# Patient Record
Sex: MALE | Race: WHITE | NOT HISPANIC OR LATINO | Employment: OTHER | URBAN - METROPOLITAN AREA
[De-identification: names, ages, dates, MRNs, and addresses within clinical notes are randomized per-mention and may not be internally consistent; named-entity substitution may affect disease eponyms.]

---

## 2024-01-14 ENCOUNTER — APPOINTMENT (OUTPATIENT)
Dept: RADIOLOGY | Facility: HOSPITAL | Age: 79
End: 2024-01-14
Payer: COMMERCIAL

## 2024-01-14 ENCOUNTER — HOSPITAL ENCOUNTER (EMERGENCY)
Facility: HOSPITAL | Age: 79
Discharge: HOME | End: 2024-01-15
Payer: COMMERCIAL

## 2024-01-14 DIAGNOSIS — S61.216A LACERATION OF RIGHT LITTLE FINGER, FOREIGN BODY PRESENCE UNSPECIFIED, NAIL DAMAGE STATUS UNSPECIFIED, INITIAL ENCOUNTER: Primary | ICD-10-CM

## 2024-01-14 DIAGNOSIS — S61.214A LACERATION OF RIGHT RING FINGER WITHOUT DAMAGE TO NAIL, FOREIGN BODY PRESENCE UNSPECIFIED, INITIAL ENCOUNTER: ICD-10-CM

## 2024-01-14 PROCEDURE — 96372 THER/PROPH/DIAG INJ SC/IM: CPT

## 2024-01-14 PROCEDURE — 99284 EMERGENCY DEPT VISIT MOD MDM: CPT

## 2024-01-14 PROCEDURE — 90715 TDAP VACCINE 7 YRS/> IM: CPT

## 2024-01-14 PROCEDURE — 2500000004 HC RX 250 GENERAL PHARMACY W/ HCPCS (ALT 636 FOR OP/ED)

## 2024-01-14 PROCEDURE — 12001 RPR S/N/AX/GEN/TRNK 2.5CM/<: CPT

## 2024-01-14 PROCEDURE — 73130 X-RAY EXAM OF HAND: CPT | Mod: RIGHT SIDE | Performed by: RADIOLOGY

## 2024-01-14 PROCEDURE — 99283 EMERGENCY DEPT VISIT LOW MDM: CPT | Mod: 25

## 2024-01-14 PROCEDURE — 2500000005 HC RX 250 GENERAL PHARMACY W/O HCPCS

## 2024-01-14 PROCEDURE — 73130 X-RAY EXAM OF HAND: CPT | Mod: RT,FR

## 2024-01-14 PROCEDURE — 90471 IMMUNIZATION ADMIN: CPT

## 2024-01-14 RX ORDER — LIDOCAINE HYDROCHLORIDE 10 MG/ML
20 INJECTION INFILTRATION; PERINEURAL ONCE
Status: COMPLETED | OUTPATIENT
Start: 2024-01-14 | End: 2024-01-14

## 2024-01-14 RX ORDER — ACETAMINOPHEN 325 MG/1
650 TABLET ORAL ONCE
Status: COMPLETED | OUTPATIENT
Start: 2024-01-14 | End: 2024-01-14

## 2024-01-14 RX ADMIN — TETANUS TOXOID, REDUCED DIPHTHERIA TOXOID AND ACELLULAR PERTUSSIS VACCINE, ADSORBED 0.5 ML: 5; 2.5; 8; 8; 2.5 SUSPENSION INTRAMUSCULAR at 22:14

## 2024-01-14 RX ADMIN — ACETAMINOPHEN 650 MG: 325 TABLET ORAL at 22:13

## 2024-01-14 RX ADMIN — LIDOCAINE HYDROCHLORIDE 200 MG: 10 INJECTION, SOLUTION INFILTRATION; PERINEURAL at 22:10

## 2024-01-14 ASSESSMENT — COLUMBIA-SUICIDE SEVERITY RATING SCALE - C-SSRS
6. HAVE YOU EVER DONE ANYTHING, STARTED TO DO ANYTHING, OR PREPARED TO DO ANYTHING TO END YOUR LIFE?: NO
2. HAVE YOU ACTUALLY HAD ANY THOUGHTS OF KILLING YOURSELF?: NO
1. IN THE PAST MONTH, HAVE YOU WISHED YOU WERE DEAD OR WISHED YOU COULD GO TO SLEEP AND NOT WAKE UP?: NO

## 2024-01-14 ASSESSMENT — LIFESTYLE VARIABLES
EVER FELT BAD OR GUILTY ABOUT YOUR DRINKING: NO
REASON UNABLE TO ASSESS: NO
HAVE PEOPLE ANNOYED YOU BY CRITICIZING YOUR DRINKING: NO
EVER HAD A DRINK FIRST THING IN THE MORNING TO STEADY YOUR NERVES TO GET RID OF A HANGOVER: NO
HAVE YOU EVER FELT YOU SHOULD CUT DOWN ON YOUR DRINKING: NO

## 2024-01-14 ASSESSMENT — PAIN DESCRIPTION - PROGRESSION: CLINICAL_PROGRESSION: NOT CHANGED

## 2024-01-14 ASSESSMENT — PAIN DESCRIPTION - DESCRIPTORS: DESCRIPTORS: ACHING

## 2024-01-14 ASSESSMENT — PAIN - FUNCTIONAL ASSESSMENT: PAIN_FUNCTIONAL_ASSESSMENT: 0-10

## 2024-01-14 ASSESSMENT — PAIN DESCRIPTION - ORIENTATION: ORIENTATION: RIGHT

## 2024-01-14 ASSESSMENT — PAIN SCALES - GENERAL: PAINLEVEL_OUTOF10: 8

## 2024-01-14 ASSESSMENT — PAIN DESCRIPTION - LOCATION: LOCATION: FINGER (COMMENT WHICH ONE)

## 2024-01-14 ASSESSMENT — PAIN DESCRIPTION - PAIN TYPE: TYPE: ACUTE PAIN

## 2024-01-15 VITALS
OXYGEN SATURATION: 96 % | TEMPERATURE: 98.1 F | BODY MASS INDEX: 25.73 KG/M2 | WEIGHT: 190 LBS | DIASTOLIC BLOOD PRESSURE: 84 MMHG | HEIGHT: 72 IN | SYSTOLIC BLOOD PRESSURE: 165 MMHG | RESPIRATION RATE: 16 BRPM | HEART RATE: 60 BPM

## 2024-01-15 PROCEDURE — 2500000001 HC RX 250 WO HCPCS SELF ADMINISTERED DRUGS (ALT 637 FOR MEDICARE OP)

## 2024-01-15 PROCEDURE — 2500000004 HC RX 250 GENERAL PHARMACY W/ HCPCS (ALT 636 FOR OP/ED)

## 2024-01-15 RX ORDER — SULFAMETHOXAZOLE AND TRIMETHOPRIM 800; 160 MG/1; MG/1
1 TABLET ORAL 2 TIMES DAILY
Qty: 14 TABLET | Refills: 0 | Status: SHIPPED | OUTPATIENT
Start: 2024-01-15 | End: 2024-01-22

## 2024-01-15 RX ORDER — CEPHALEXIN 500 MG/1
500 CAPSULE ORAL 4 TIMES DAILY
Qty: 40 CAPSULE | Refills: 0 | Status: SHIPPED | OUTPATIENT
Start: 2024-01-15 | End: 2024-01-25

## 2024-01-15 RX ORDER — CEPHALEXIN 500 MG/1
500 CAPSULE ORAL ONCE
Status: COMPLETED | OUTPATIENT
Start: 2024-01-15 | End: 2024-01-15

## 2024-01-15 RX ORDER — SULFAMETHOXAZOLE AND TRIMETHOPRIM 800; 160 MG/1; MG/1
1 TABLET ORAL ONCE
Status: COMPLETED | OUTPATIENT
Start: 2024-01-15 | End: 2024-01-15

## 2024-01-15 RX ADMIN — CEPHALEXIN 500 MG: 500 CAPSULE ORAL at 00:27

## 2024-01-15 RX ADMIN — SULFAMETHOXAZOLE AND TRIMETHOPRIM 1 TABLET: 800; 160 TABLET ORAL at 00:27

## 2024-01-15 ASSESSMENT — PAIN SCALES - GENERAL: PAINLEVEL_OUTOF10: 3

## 2024-01-15 ASSESSMENT — PAIN DESCRIPTION - LOCATION: LOCATION: FINGER (COMMENT WHICH ONE)

## 2024-01-15 ASSESSMENT — PAIN DESCRIPTION - DESCRIPTORS: DESCRIPTORS: ACHING

## 2024-01-15 ASSESSMENT — PAIN DESCRIPTION - PAIN TYPE: TYPE: ACUTE PAIN

## 2024-01-15 ASSESSMENT — PAIN DESCRIPTION - ORIENTATION: ORIENTATION: RIGHT

## 2024-01-15 ASSESSMENT — PAIN - FUNCTIONAL ASSESSMENT: PAIN_FUNCTIONAL_ASSESSMENT: 0-10

## 2024-01-15 NOTE — ED PROVIDER NOTES
HPI   Chief Complaint   Patient presents with    Finger Laceration     Patient stated right hand pinky finger got caught in between rope and trevino around 8:50pm. Right wrist pain. Denies blood thinners. Stated skin ripped off finger. Unsure if up to date on tetanus shot       Patient is a 78-year-old male with significant PMH of hypertension, hyperlipidemia presenting to the ED with cc of right finger injury x 945pm.  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  Patient is not up-to-date on his tetanus.  Patient denies any blood thinners.  Patient endorses some numbness at the tip of his right hand fifth digit.  Patient has not had any pain medication for symptoms.  Patient denies any other injuries.  Patient is right-handed.  Patient denies any fever chills chest pain shortness of breath abdominal pain nausea vomiting diarrhea constipation.                          Seb Coma Scale Score: 15                  Patient History   No past medical history on file.  No past surgical history on file.  No family history on file.  Social History     Tobacco Use    Smoking status: Not on file    Smokeless tobacco: Not on file   Substance Use Topics    Alcohol use: Not on file    Drug use: Not on file       Physical Exam   ED Triage Vitals [01/14/24 2156]   Temp Heart Rate Resp BP   36.8 °C (98.2 °F) 71 18 164/83      SpO2 Temp Source Heart Rate Source Patient Position   94 % Temporal Monitor Sitting      BP Location FiO2 (%)     Left arm --       Physical Exam  HENT:      Head: Normocephalic.   Eyes:      Extraocular Movements: Extraocular movements intact.      Pupils: Pupils are equal, round, and reactive to light.   Cardiovascular:      Rate and Rhythm: Normal rate and regular rhythm.      Pulses: Normal pulses.      Heart sounds: Normal heart sounds.   Pulmonary:      Effort: Pulmonary effort is normal.      Breath sounds: No wheezing, rhonchi or rales.   Abdominal:      Palpations: Abdomen is  soft.      Tenderness: There is no abdominal tenderness. There is no guarding or rebound.   Musculoskeletal:         General: Tenderness present.      Cervical back: Normal range of motion.      Comments: Pain on palpation to the right hand distal tip of the fifth and fourth digits.   Skin:     Capillary Refill: Capillary refill takes less than 2 seconds.      Findings: Lesion (Part avulsion of the skin of the distal pinky.  Small laceration to the distal fourth digit) present.   Neurological:      General: No focal deficit present.      Mental Status: He is alert and oriented to person, place, and time. Mental status is at baseline.   Psychiatric:         Mood and Affect: Mood normal.         ED Course & MDM   Diagnoses as of 01/15/24 0010   Laceration of right little finger, foreign body presence unspecified, nail damage status unspecified, initial encounter   Laceration of right ring finger without damage to nail, foreign body presence unspecified, initial encounter       Medical Decision Making  Medical Decision Making:  Patient presented as described in HPI. Patient case including ROS, PE, and treatment and plan discussed with ED attending if attached as cosigner. Due to patients presentation orders completed include as documented.  Presents to the ED with cc of finger injury times today.  Patient states he cut his finger on metal.  Patient is not up-to-date on his tetanus.  Patient is not on any blood thinners.  Patient is right-handed.  Patient has not had any pain medication for symptoms.  Patient given Tylenol.  Patient is nontoxic.  Abdomen is soft and nontender, full and equal pulses bilaterally pain on palpation to the distal right hand fifth and fourth digits.  Partial avulsion of the skin of the right pinky finger.  And small laceration of the distal fourth digit.  Patient updated on tetanus here.  X-rays were ordered.  Area was extensively cleansed and irrigated.  7 sutures were placed on the right  hand distal fifth digit laceration and 1 suture was placed on the fourth digit laceration. Finger was splinted by nurse and wrapped in gauze.  Patient given Keflex and Bactrim here in the ER for prophylaxis.  Patient discharged home with Keflex and Bactrim.  Patient educated on follow-up with Dr. Rodriguez and given referral.  Patient educated on any worsening symptoms to return.  Patient educated on suture care and removal.  Patient remained stable and discharged.  Patient was advised to follow up with PCP or recommended provider in 2-3 days for another evaluation and exam. I advised patient/guardian to return or go to closest emergency room immediately if symptoms change, get worse, new symptoms develop prior to follow up. If there is no improvement in symptoms in the next 24 hours they are advised to return for further evaluation and exam. I also explained the plan and treatment course. Patient/guardian is in agreement with plan, treatment course, and follow up and states verbally that they will comply.      Patient care discussed with: N/A  Social Determinants affecting care: N/A    Final diagnosis and disposition as below.  See CI    Homegoing. I discussed the differential; results and discharge plan with the patient and/or family/friend/caregiver if present.  I emphasized the importance of follow-up with the physician I referred them to in the timeframe recommended.  I explained reasons for the patient to return to the Emergency Department. They agreed that if they feel their condition is worsening or if they have any other concern they should call 911 immediately for further assistance. I gave the patient an opportunity to ask all questions they had and answered all of them accordingly. They understand return precautions and discharge instructions. The patient and/or family/friend/caregiver expressed understanding verbally and that they would comply.       Disposition:  Discharge      This note has been  transcribed using voice recognition and may contain grammatical errors, misplaced words, incorrect words, incorrect phrases or other errors.        XR hand right 3+ views   Final Result   Osteoarthritis of the first carpometacarpal joint.   No acute osseous abnormality.   Signed by Sameer Weller MD         Procedure  Laceration Repair    Performed by: Sharron Damon PA-C  Authorized by: Jw Mary MD    Consent:     Consent obtained:  Verbal  Laceration details:     Location:  Finger    Finger location:  R small finger    Length (cm):  2  Treatment:     Area cleansed with:  Saline and chlorhexidine    Amount of cleaning:  Extensive  Skin repair:     Repair method:  Sutures    Suture size:  4-0    Suture material:  Nylon    Suture technique:  Simple interrupted    Number of sutures:  8  Approximation:     Approximation:  Close  Repair type:     Repair type:  Simple  Post-procedure details:     Dressing:  Antibiotic ointment and non-adherent dressing       Sharron Damon PA-C  01/15/24 0013       Sharron Damon PA-C  01/15/24 0035

## 2024-01-17 ENCOUNTER — OFFICE VISIT (OUTPATIENT)
Dept: ORTHOPEDIC SURGERY | Facility: CLINIC | Age: 79
End: 2024-01-17
Payer: COMMERCIAL

## 2024-01-17 DIAGNOSIS — S61.219A: Primary | ICD-10-CM

## 2024-01-17 PROCEDURE — 1159F MED LIST DOCD IN RCRD: CPT

## 2024-01-17 PROCEDURE — 1125F AMNT PAIN NOTED PAIN PRSNT: CPT

## 2024-01-17 PROCEDURE — 99203 OFFICE O/P NEW LOW 30 MIN: CPT

## 2024-01-17 NOTE — PROGRESS NOTES
HPI  Jeremi Pizano is a 78 y.o. male  in office today for   Chief Complaint   Patient presents with    Right Hand - Hand Injury     Laceration of right pinky finger, caught between rope. Went to ED on 1/14/24, x-rays were done at that time.     Right Wrist - Pain   .  he had 7 stiches placed in his pinky finger and 1 in the ring finger.  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  He has been taking the antibiotics as prescribed, tylenol and motrin for pain.        Medication  Current Outpatient Medications on File Prior to Visit   Medication Sig Dispense Refill    cephalexin (Keflex) 500 mg capsule Take 1 capsule (500 mg) by mouth 4 times a day for 10 days. 40 capsule 0    sulfamethoxazole-trimethoprim (Bactrim DS) 800-160 mg tablet Take 1 tablet by mouth 2 times a day for 7 days. 14 tablet 0     No current facility-administered medications on file prior to visit.       Physical Exam  Constitutional: well developed, well nourished male in no acute distress  Psychiatric: normal mood, appropriate affect  Eyes: sclera anicteric  HENT: normocephalic/atraumatic  CV: regular rate and rhythm   Respiratory: non labored breathing  Integumentary: no rash  Neurological: moves all extremities    Right Hand Exam     Tenderness   Right hand tenderness location: pinky and ring fingers distal palmar surface.    Range of Motion   Hand   PIP Little: 80   DIP Little: 60     Other   Erythema: absent  Scars: absent  Sensation: normal    Comments:  Laceration palmar surface of distal pinky finger closed with 7 sutures.  No erythema, warmth, drainage.    Small laceration palmar surface of distal ring finger closed with 1 suture. No erythema, warmth, drainage.              Imaging/Lab:  X-rays were taken 1/14/24 which were reviewed by myself and read by radiology and show no fracture, dislocation or bony defect.  Severe 1st CMC joint space  narrowing.      Assessment  Assessment: finger laceration ring and pinky fingers    Plan  Plan:  History, physical exam, and imaging were reviewed with patient. Wounds cleaned and redressed by myself.  Instructed patient on how I want him dressing the finger which should be changed once per day.  Can wash hands with clean soapy water, no submerging hand. 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  Tylenol and motrin as needed for pain.  Follow Up: End of next week for wound check and suture removal    All questions were answered for the patient prior to end of exam and patient addressed their understanding.    Shelley Cruz PA-C  01/17/24

## 2024-01-25 ENCOUNTER — OFFICE VISIT (OUTPATIENT)
Dept: ORTHOPEDIC SURGERY | Facility: CLINIC | Age: 79
End: 2024-01-25
Payer: COMMERCIAL

## 2024-01-25 VITALS — HEIGHT: 72 IN | WEIGHT: 200 LBS | BODY MASS INDEX: 27.09 KG/M2

## 2024-01-25 DIAGNOSIS — S61.219A: Primary | ICD-10-CM

## 2024-01-25 PROCEDURE — 1160F RVW MEDS BY RX/DR IN RCRD: CPT

## 2024-01-25 PROCEDURE — 1125F AMNT PAIN NOTED PAIN PRSNT: CPT

## 2024-01-25 PROCEDURE — 1036F TOBACCO NON-USER: CPT

## 2024-01-25 PROCEDURE — 1159F MED LIST DOCD IN RCRD: CPT

## 2024-01-25 PROCEDURE — 99212 OFFICE O/P EST SF 10 MIN: CPT

## 2024-01-25 NOTE — PROGRESS NOTES
HPI  Jeremi Pizano is a 78 y.o. male in office today for follow up of side: right hand pinky finger laceration wound check and suture removal.  he is doing well.  No pain unless he accidentally hits the finger against something.  He has been changing the dressing daily as instructed.      Physical Exam  Constitutional: well developed, well nourished male in no acute distress  Psychiatric: normal mood, appropriate affect  Eyes: sclera anicteric  HENT: normocephalic/atraumatic  CV: regular rate and rhythm   Respiratory: non labored breathing  Integumentary: no rash  Neurological: moves all extremities    Right Hand Exam     Tenderness   Right hand tenderness location: distal pinky finger.    Range of Motion   Hand   MP Little: normal   PIP Little: 60   DIP Little: 40     Other   Erythema: absent  Scars: present    Comments:  Laceration without erythema, warmth, drainage. Laceration on ring finger also without erythema, warmth, or drainage.          Imaging/Lab:  No new imaging    Assessment  Assessment: Right small finger laceration    Plan  Plan:  History, physical exam, and imaging were reviewed with patient. Sutures were removed by MA.  Wound redressed by myself and patient should continue to change daily on his own.  OK to be washing hand with clean soap and water, no submerging still.   Follow Up: 2 weeks unless any issues sooner.    All questions were answered for the patient prior to end of exam and patient addressed their understanding.    Shelley Cruz PA-C  01/25/24

## 2024-02-08 ENCOUNTER — OFFICE VISIT (OUTPATIENT)
Dept: ORTHOPEDIC SURGERY | Facility: CLINIC | Age: 79
End: 2024-02-08
Payer: COMMERCIAL

## 2024-02-08 DIAGNOSIS — S61.219A: Primary | ICD-10-CM

## 2024-02-08 PROCEDURE — 1159F MED LIST DOCD IN RCRD: CPT

## 2024-02-08 PROCEDURE — 1125F AMNT PAIN NOTED PAIN PRSNT: CPT

## 2024-02-08 PROCEDURE — 99212 OFFICE O/P EST SF 10 MIN: CPT

## 2024-02-08 PROCEDURE — 1160F RVW MEDS BY RX/DR IN RCRD: CPT

## 2024-02-08 PROCEDURE — 1036F TOBACCO NON-USER: CPT

## 2024-02-08 NOTE — PROGRESS NOTES
HPI  Jeremi Pizano is a 78 y.o. male in office today for follow up of side: right small finger laceration.  he is doing well, no issues.  Continues to dress the finger as instructed.  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      Physical Exam  Constitutional: well developed, well nourished male in no acute distress  Psychiatric: normal mood, appropriate affect  Eyes: sclera anicteric  HENT: normocephalic/atraumatic  CV: regular rate and rhythm   Respiratory: non labored breathing  Integumentary: no rash  Neurological: moves all extremities    Right Hand Exam     Tenderness   The patient is experiencing no tenderness.     Range of Motion   Hand   MP Little: normal   PIP Little: 80   DIP Little: 60     Other   Erythema: absent  Scars: present  Sensation: normal          Imaging/Lab:  No new imaging    Assessment  Assessment: right small finger laceration    Plan  Plan:  History, physical exam, and imaging were reviewed with patient. Patient to continue to keep finger clean and dry.  Can trim away dead skin as needed.  Can continue to clean finger with soap and water, still no submerging.  Would was redressed and supplies given so he can continue to dress for the next 1-2 more weeks.  Follow Up: as needed for any issues or concerns with the finger    All questions were answered for the patient prior to end of exam and patient addressed their understanding.    Shelley Cruz PA-C  02/08/24